# Patient Record
Sex: FEMALE | Race: WHITE | NOT HISPANIC OR LATINO | URBAN - METROPOLITAN AREA
[De-identification: names, ages, dates, MRNs, and addresses within clinical notes are randomized per-mention and may not be internally consistent; named-entity substitution may affect disease eponyms.]

---

## 2017-01-20 ENCOUNTER — OUTPATIENT (OUTPATIENT)
Dept: OUTPATIENT SERVICES | Facility: HOSPITAL | Age: 52
LOS: 1 days | End: 2017-01-20
Payer: COMMERCIAL

## 2017-01-20 ENCOUNTER — APPOINTMENT (OUTPATIENT)
Dept: RADIOLOGY | Facility: HOSPITAL | Age: 52
End: 2017-01-20

## 2017-01-20 PROCEDURE — 73140 X-RAY EXAM OF FINGER(S): CPT

## 2017-05-23 ENCOUNTER — OUTPATIENT (OUTPATIENT)
Dept: OUTPATIENT SERVICES | Facility: HOSPITAL | Age: 52
LOS: 1 days | End: 2017-05-23
Payer: COMMERCIAL

## 2017-05-23 DIAGNOSIS — M54.12 RADICULOPATHY, CERVICAL REGION: ICD-10-CM

## 2017-05-23 PROCEDURE — 77003 FLUOROGUIDE FOR SPINE INJECT: CPT

## 2017-05-23 PROCEDURE — 62321 NJX INTERLAMINAR CRV/THRC: CPT

## 2017-06-06 ENCOUNTER — OUTPATIENT (OUTPATIENT)
Dept: OUTPATIENT SERVICES | Facility: HOSPITAL | Age: 52
LOS: 1 days | End: 2017-06-06
Payer: COMMERCIAL

## 2017-06-06 DIAGNOSIS — M54.12 RADICULOPATHY, CERVICAL REGION: ICD-10-CM

## 2017-06-06 PROCEDURE — 77003 FLUOROGUIDE FOR SPINE INJECT: CPT

## 2017-06-06 PROCEDURE — 62321 NJX INTERLAMINAR CRV/THRC: CPT

## 2018-12-05 ENCOUNTER — OUTPATIENT (OUTPATIENT)
Dept: OUTPATIENT SERVICES | Facility: HOSPITAL | Age: 53
LOS: 1 days | End: 2018-12-05
Payer: COMMERCIAL

## 2018-12-05 DIAGNOSIS — M54.12 RADICULOPATHY, CERVICAL REGION: ICD-10-CM

## 2018-12-05 PROCEDURE — 77003 FLUOROGUIDE FOR SPINE INJECT: CPT

## 2018-12-05 PROCEDURE — 62321 NJX INTERLAMINAR CRV/THRC: CPT

## 2019-02-12 ENCOUNTER — TRANSCRIPTION ENCOUNTER (OUTPATIENT)
Age: 54
End: 2019-02-12

## 2020-01-03 ENCOUNTER — TRANSCRIPTION ENCOUNTER (OUTPATIENT)
Age: 55
End: 2020-01-03

## 2020-06-22 ENCOUNTER — TRANSCRIPTION ENCOUNTER (OUTPATIENT)
Age: 55
End: 2020-06-22

## 2020-07-13 ENCOUNTER — APPOINTMENT (OUTPATIENT)
Dept: RHEUMATOLOGY | Facility: CLINIC | Age: 55
End: 2020-07-13
Payer: COMMERCIAL

## 2020-07-13 VITALS
TEMPERATURE: 98.1 F | HEART RATE: 87 BPM | SYSTOLIC BLOOD PRESSURE: 91 MMHG | HEIGHT: 63 IN | BODY MASS INDEX: 28 KG/M2 | OXYGEN SATURATION: 96 % | DIASTOLIC BLOOD PRESSURE: 64 MMHG | WEIGHT: 158 LBS

## 2020-07-13 DIAGNOSIS — M25.542 PAIN IN JOINTS OF RIGHT HAND: ICD-10-CM

## 2020-07-13 DIAGNOSIS — Z87.891 PERSONAL HISTORY OF NICOTINE DEPENDENCE: ICD-10-CM

## 2020-07-13 DIAGNOSIS — R60.9 EDEMA, UNSPECIFIED: ICD-10-CM

## 2020-07-13 DIAGNOSIS — F17.200 NICOTINE DEPENDENCE, UNSPECIFIED, UNCOMPLICATED: ICD-10-CM

## 2020-07-13 DIAGNOSIS — R52 PAIN, UNSPECIFIED: ICD-10-CM

## 2020-07-13 DIAGNOSIS — M25.541 PAIN IN JOINTS OF RIGHT HAND: ICD-10-CM

## 2020-07-13 PROCEDURE — 99244 OFF/OP CNSLTJ NEW/EST MOD 40: CPT

## 2020-07-13 RX ORDER — CYCLOBENZAPRINE HYDROCHLORIDE 7.5 MG/1
TABLET, FILM COATED ORAL
Refills: 0 | Status: DISCONTINUED | COMMUNITY
End: 2020-07-13

## 2020-07-13 RX ORDER — NABUMETONE 750 MG/1
750 TABLET, FILM COATED ORAL
Refills: 0 | Status: DISCONTINUED | COMMUNITY
End: 2020-07-13

## 2020-07-17 LAB
25(OH)D3 SERPL-MCNC: 38.7 NG/ML
ALBUMIN MFR SERPL ELPH: 62 %
ALBUMIN SERPL-MCNC: 4.1 G/DL
ALBUMIN/GLOB SERPL: 1.6 RATIO
ALPHA1 GLOB MFR SERPL ELPH: 4.5 %
ALPHA1 GLOB SERPL ELPH-MCNC: 0.3 G/DL
ALPHA2 GLOB MFR SERPL ELPH: 9.8 %
ALPHA2 GLOB SERPL ELPH-MCNC: 0.6 G/DL
B-GLOBULIN MFR SERPL ELPH: 10.1 %
B-GLOBULIN SERPL ELPH-MCNC: 0.7 G/DL
CK SERPL-CCNC: 105 U/L
CRP SERPL-MCNC: <0.1 MG/DL
DEPRECATED KAPPA LC FREE/LAMBDA SER: 1.9 RATIO
ENA SCL70 IGG SER IA-ACNC: <0.2 AL
FERRITIN SERPL-MCNC: 63 NG/ML
G6PD SER-CCNC: 13.4 U/G HGB
GAMMA GLOB FLD ELPH-MCNC: 0.9 G/DL
GAMMA GLOB MFR SERPL ELPH: 13.6 %
IGA SER QL IEP: 108 MG/DL
IGG SER QL IEP: 894 MG/DL
IGM SER QL IEP: 138 MG/DL
INTERPRETATION SERPL IEP-IMP: NORMAL
KAPPA LC CSF-MCNC: 1.09 MG/DL
KAPPA LC SERPL-MCNC: 2.07 MG/DL
M PROTEIN SPEC IFE-MCNC: NORMAL
PROT SERPL-MCNC: 6.6 G/DL
PROT SERPL-MCNC: 6.6 G/DL
RNA POLYMERASE III IGG: 2 UNITS

## 2020-07-28 DIAGNOSIS — G62.9 POLYNEUROPATHY, UNSPECIFIED: ICD-10-CM

## 2020-07-29 LAB
EJ AB SER QL: NEGATIVE
ENA JO1 AB SER IA-ACNC: <20 UNITS
ENA PM/SCL AB SER-ACNC: <20 UNITS
ENA SM+RNP AB SER IA-ACNC: <20 UNITS
ENA SS-A IGG SER QL: <20 UNITS
FIBRILLARIN AB SER QL: NEGATIVE
KU AB SER QL: NEGATIVE
MDA-5 (P140)(CADM-140): <20 UNITS
MI2 AB SER QL: NEGATIVE
NXP-2 (P140): <20 UNITS
OJ AB SER QL: NEGATIVE
PL12 AB SER QL: NEGATIVE
PL7 AB SER QL: NEGATIVE
SRP AB SERPL QL: NEGATIVE
TIF GAMMA (P155/140): <20 UNITS
U2 SNRNP AB SER QL: NEGATIVE

## 2020-08-06 RX ORDER — TIZANIDINE 2 MG/1
2 TABLET ORAL
Qty: 100 | Refills: 1 | Status: DISCONTINUED | COMMUNITY
Start: 2020-07-13 | End: 2020-08-06

## 2020-08-17 ENCOUNTER — APPOINTMENT (OUTPATIENT)
Dept: NEUROLOGY | Facility: CLINIC | Age: 55
End: 2020-08-17

## 2020-09-23 ENCOUNTER — APPOINTMENT (OUTPATIENT)
Dept: RHEUMATOLOGY | Facility: CLINIC | Age: 55
End: 2020-09-23

## 2020-10-13 ENCOUNTER — APPOINTMENT (OUTPATIENT)
Dept: RHEUMATOLOGY | Facility: CLINIC | Age: 55
End: 2020-10-13
Payer: COMMERCIAL

## 2020-10-13 VITALS
DIASTOLIC BLOOD PRESSURE: 68 MMHG | OXYGEN SATURATION: 96 % | WEIGHT: 156 LBS | SYSTOLIC BLOOD PRESSURE: 101 MMHG | TEMPERATURE: 98 F | BODY MASS INDEX: 27.64 KG/M2 | RESPIRATION RATE: 16 BRPM | HEART RATE: 80 BPM | HEIGHT: 63 IN

## 2020-10-13 DIAGNOSIS — I73.00 RAYNAUD'S SYNDROME W/OUT GANGRENE: ICD-10-CM

## 2020-10-13 PROCEDURE — 99214 OFFICE O/P EST MOD 30 MIN: CPT

## 2020-10-13 RX ORDER — GABAPENTIN 300 MG/1
300 CAPSULE ORAL
Qty: 90 | Refills: 2 | Status: DISCONTINUED | COMMUNITY
Start: 2020-08-06 | End: 2020-10-13

## 2020-10-13 NOTE — ASSESSMENT
[FreeTextEntry1] : 55 year old female here for evaluation of arthralgias and fatigue. \par \par 1. Arthralgias and fatigue with h/o Raynaud's, photosensitivity and pos JORGE 1:1280 (nucleolar pattern). On exam has puffy appearing hands with palmar erythema. No distinct joint tenderness or synovitis. Unclear if there is a component of inflammation contributing to her symptoms. Since she has had no improvement with NSAIDs and muscle relaxants, will do a trial of Prednisone taper. Risks, benefits and side effects of the medication discussed with the patient in detail. Advised to call me if she experience any side effects\par \par 2. Also has a component of FBM - no change in symptoms with Meloxicam and Tizanidine. Discussed Gabapentin but did not try due to side effects. Will hold off for now and assess symptoms on steroid taper.   \par \par 3. Raynaud's phenomenon: trial of Amlodipine 2.5 mg daily. Will monitor BP closely since patient has a low baseline BP. Risks, benefits and side effects of the medication discussed with the patient in detail. Advised to call me if she experience any side effects\par \par 4. Abnormal Chest CT: advised to f/u PCP or pulmonologist to repeat surveillance chest CT given h/o smoking and multiple pulm nodules on CT chest in 2018. \par \par 5. Bone health: Ca+D as needed \par \par 6. HCM: Discussed pneumonia vaccine today. Patient is not immunocompromised. She would like to discuss the vaccination with her pulmonologist. \par \par Follow up in 2 months

## 2020-10-13 NOTE — REVIEW OF SYSTEMS
[Fever] : no fever [Chills] : no chills [Feeling Tired] : not feeling tired [Recent Weight Gain (___ Lbs)] : no recent weight gain [Recent Weight Loss (___ Lbs)] : no recent weight loss [Joint Swelling] : no joint swelling

## 2020-10-13 NOTE — HISTORY OF PRESENT ILLNESS
[FreeTextEntry1] : Extensive neurologic testing was negative. MRI of the R hand with soft tissues swelling but no synovitis or inflammatory changes. However, continues to c/o discomfort and pain in the hands and feet as well as worsening Raynaud's

## 2020-10-13 NOTE — PHYSICAL EXAM
[FreeTextEntry1] : Diffuse swelling over the digits with very mild skin tightening up to the PIPs. Palmar erythema

## 2020-12-03 ENCOUNTER — APPOINTMENT (OUTPATIENT)
Dept: ENDOCRINOLOGY | Facility: CLINIC | Age: 55
End: 2020-12-03

## 2020-12-07 ENCOUNTER — RX RENEWAL (OUTPATIENT)
Age: 55
End: 2020-12-07

## 2021-02-01 ENCOUNTER — APPOINTMENT (OUTPATIENT)
Dept: RHEUMATOLOGY | Facility: CLINIC | Age: 56
End: 2021-02-01

## 2021-02-16 ENCOUNTER — APPOINTMENT (OUTPATIENT)
Dept: RHEUMATOLOGY | Facility: CLINIC | Age: 56
End: 2021-02-16

## 2022-01-21 ENCOUNTER — TRANSCRIPTION ENCOUNTER (OUTPATIENT)
Age: 57
End: 2022-01-21

## 2023-01-10 ENCOUNTER — APPOINTMENT (OUTPATIENT)
Dept: ORTHOPEDIC SURGERY | Facility: CLINIC | Age: 58
End: 2023-01-10
Payer: COMMERCIAL

## 2023-01-10 VITALS — WEIGHT: 156 LBS | BODY MASS INDEX: 27.64 KG/M2 | HEIGHT: 63 IN

## 2023-01-10 DIAGNOSIS — M54.16 RADICULOPATHY, LUMBAR REGION: ICD-10-CM

## 2023-01-10 PROCEDURE — 72170 X-RAY EXAM OF PELVIS: CPT

## 2023-01-10 PROCEDURE — 72100 X-RAY EXAM L-S SPINE 2/3 VWS: CPT

## 2023-01-10 PROCEDURE — 99214 OFFICE O/P EST MOD 30 MIN: CPT

## 2023-01-10 NOTE — ASSESSMENT
[FreeTextEntry1] : will start her on medrol robaxin and therapy f/u 2 weeks with pain mgt if not improved

## 2023-01-10 NOTE — HISTORY OF PRESENT ILLNESS
[Lower back] : lower back [Dull/Aching] : dull/aching [Radiating] : radiating [de-identified] : 1-10-23- she states one day history of atraumatic lower back pain with limping and radicular complaints. states pain came on after she stood up from prolonged sitting. she tried otc nsaids with minimal help\par \par works a desk position  [] : no [FreeTextEntry3] : 01/09/23 [FreeTextEntry5] : patient first felt pain while getting out of her car but then the pain got progressively worse time.  [FreeTextEntry7] : into right buttocks

## 2023-01-19 ENCOUNTER — APPOINTMENT (OUTPATIENT)
Dept: PAIN MANAGEMENT | Facility: CLINIC | Age: 58
End: 2023-01-19
Payer: COMMERCIAL

## 2023-01-19 VITALS — BODY MASS INDEX: 27.64 KG/M2 | HEIGHT: 63 IN | WEIGHT: 156 LBS

## 2023-01-19 DIAGNOSIS — S33.5XXA SPRAIN OF LIGAMENTS OF LUMBAR SPINE, INITIAL ENCOUNTER: ICD-10-CM

## 2023-01-19 DIAGNOSIS — M79.10 MYALGIA, UNSPECIFIED SITE: ICD-10-CM

## 2023-01-19 PROCEDURE — 99244 OFF/OP CNSLTJ NEW/EST MOD 40: CPT | Mod: 25

## 2023-01-19 PROCEDURE — 20552 NJX 1/MLT TRIGGER POINT 1/2: CPT

## 2023-01-19 PROCEDURE — J3490M: CUSTOM

## 2023-01-19 NOTE — PHYSICAL EXAM
[de-identified] : PHYSICAL EXAM\par \par Constitutional: \par Appears well, no apparent distress\par Ability to communicate: Normal\par Respiratory: non-labored breathing\par Skin: no rash noted\par Head: normocephalic, atraumatic\par Neck: no visible thyroid enlargement\par Eyes: extraocular movements intact\par Neurologic: alert and oriented x3\par Psychiatric: normal mood, affect, and behavior\par \par Lumbar Spine: \par Palpation: left and right lumbar paraspinal spasm and left and right lumbar paraspinal tenderness to palpation.\par ROM: Diminished range of motion in all plains.  Patient notes pain with lateral bending to the left and right.\par MMT: Motor exam is 5/5 through out bilateral lower extremities.\par Sensation: Light touch and pain is intact throughout bilateral lower extremities.\par Reflexes: achilles and patella reflexes are intact and  symmetrical.  No sustained clonus.\par Special Testing: Positive kemps maneuver on the left and right side\par \par

## 2023-01-19 NOTE — REASON FOR VISIT
[Initial Consultation] : an initial pain management consultation [FreeTextEntry2] :  referred pt for back pain

## 2023-01-19 NOTE — HISTORY OF PRESENT ILLNESS
[Lower back] : lower back [9] : 9 [Dull/Aching] : dull/aching [Tightness] : tightness [Constant] : constant [Household chores] : household chores [Leisure] : leisure [Sleep] : sleep [Meds] : meds [Heat] : heat [Standing] : standing [Walking] : walking [Lying in bed] : lying in bed [] : no [FreeTextEntry7] : buttocks, neck [de-identified] : positioning

## 2023-02-13 ENCOUNTER — APPOINTMENT (OUTPATIENT)
Dept: PAIN MANAGEMENT | Facility: CLINIC | Age: 58
End: 2023-02-13

## 2023-12-04 ENCOUNTER — APPOINTMENT (OUTPATIENT)
Dept: ORTHOPEDIC SURGERY | Facility: CLINIC | Age: 58
End: 2023-12-04
Payer: COMMERCIAL

## 2023-12-04 VITALS — BODY MASS INDEX: 27.64 KG/M2 | WEIGHT: 156 LBS | HEIGHT: 63 IN

## 2023-12-04 DIAGNOSIS — M17.12 UNILATERAL PRIMARY OSTEOARTHRITIS, LEFT KNEE: ICD-10-CM

## 2023-12-04 DIAGNOSIS — M17.11 UNILATERAL PRIMARY OSTEOARTHRITIS, RIGHT KNEE: ICD-10-CM

## 2023-12-04 DIAGNOSIS — M23.92 UNSPECIFIED INTERNAL DERANGEMENT OF LEFT KNEE: ICD-10-CM

## 2023-12-04 PROCEDURE — 99214 OFFICE O/P EST MOD 30 MIN: CPT | Mod: 25

## 2023-12-04 PROCEDURE — 73564 X-RAY EXAM KNEE 4 OR MORE: CPT | Mod: 50

## 2023-12-04 PROCEDURE — 20611 DRAIN/INJ JOINT/BURSA W/US: CPT | Mod: 50

## 2023-12-04 PROCEDURE — J3490M: CUSTOM

## 2023-12-04 RX ORDER — MELOXICAM 15 MG/1
15 TABLET ORAL DAILY
Qty: 30 | Refills: 1 | Status: COMPLETED | COMMUNITY
Start: 2023-12-04 | End: 2024-02-02

## 2023-12-04 RX ORDER — AMLODIPINE BESYLATE 2.5 MG/1
2.5 TABLET ORAL
Qty: 30 | Refills: 0 | Status: DISCONTINUED | COMMUNITY
Start: 2020-10-13 | End: 2023-12-04

## 2023-12-04 RX ORDER — MELOXICAM 7.5 MG/1
7.5 TABLET ORAL TWICE DAILY
Qty: 60 | Refills: 1 | Status: DISCONTINUED | COMMUNITY
Start: 2020-07-13 | End: 2023-12-04

## 2023-12-04 RX ORDER — METHOCARBAMOL 750 MG/1
750 TABLET, FILM COATED ORAL TWICE DAILY
Qty: 60 | Refills: 0 | Status: DISCONTINUED | COMMUNITY
Start: 2023-01-10 | End: 2023-12-04

## 2023-12-04 RX ORDER — PREDNISONE 5 MG/1
5 TABLET ORAL
Qty: 50 | Refills: 0 | Status: DISCONTINUED | COMMUNITY
Start: 2020-10-13 | End: 2023-12-04

## 2023-12-04 RX ORDER — METHYLPREDNISOLONE 4 MG/1
4 TABLET ORAL
Qty: 1 | Refills: 0 | Status: DISCONTINUED | COMMUNITY
Start: 2023-01-10 | End: 2023-12-04

## 2023-12-04 RX ORDER — LEVOCETIRIZINE DIHYDROCHLORIDE 5 MG/1
5 TABLET ORAL
Refills: 0 | Status: DISCONTINUED | COMMUNITY
End: 2023-12-04

## 2023-12-18 ENCOUNTER — APPOINTMENT (OUTPATIENT)
Dept: ORTHOPEDIC SURGERY | Facility: CLINIC | Age: 58
End: 2023-12-18

## 2023-12-29 ENCOUNTER — APPOINTMENT (OUTPATIENT)
Dept: ORTHOPEDIC SURGERY | Facility: CLINIC | Age: 58
End: 2023-12-29

## 2024-04-11 NOTE — REVIEW OF SYSTEMS
Physical Therapy   Date: 4/11/2024  Patient did not attend nor call to cancel  today's (4/11/2024) scheduled appointment.  This is the patient's first no show/late cancel.      Voice message was left on the patient's home number indicating missed appointment and date of next scheduled appointment.  Attendance guidelines were reviewed.  
[Negative] : Heme/Lymph

## 2024-07-24 RX ORDER — PANTOPRAZOLE 40 MG/1
TABLET, DELAYED RELEASE ORAL
Refills: 0 | Status: ACTIVE | COMMUNITY

## 2024-07-24 RX ORDER — ALBUTEROL SULFATE 90 UG/1
108 (90 BASE) INHALANT RESPIRATORY (INHALATION)
Refills: 0 | Status: ACTIVE | COMMUNITY

## 2024-07-24 RX ORDER — CLOBETASOL PROPIONATE 0.5 MG/G
0.05 OINTMENT TOPICAL
Refills: 0 | Status: ACTIVE | COMMUNITY

## 2024-07-24 RX ORDER — DENOSUMAB 60 MG/ML
INJECTION SUBCUTANEOUS
Refills: 0 | Status: ACTIVE | COMMUNITY

## 2024-07-29 ENCOUNTER — APPOINTMENT (OUTPATIENT)
Dept: OBGYN | Facility: CLINIC | Age: 59
End: 2024-07-29
Payer: COMMERCIAL

## 2024-07-29 VITALS — DIASTOLIC BLOOD PRESSURE: 60 MMHG | BODY MASS INDEX: 27.28 KG/M2 | WEIGHT: 154 LBS | SYSTOLIC BLOOD PRESSURE: 90 MMHG

## 2024-07-29 DIAGNOSIS — N94.11 SUPERFICIAL (INTROITAL) DYSPAREUNIA: ICD-10-CM

## 2024-07-29 PROCEDURE — 99204 OFFICE O/P NEW MOD 45 MIN: CPT | Mod: 25

## 2024-07-29 PROCEDURE — 56821 COLPOSCOPY VULVA W/BIOPSY: CPT

## 2024-07-29 NOTE — PHYSICAL EXAM
[Chaperone Present] : A chaperone was present in the examining room during all aspects of the physical examination [TextEntry] : ***General*** General Appearance: normal; Judgment and insight: normal; the patient is oriented to time, place and person; Recent and remote memory: normal; Mood and affect: normal; Respiratory effort: normal.  ***Pelvic Examination*** External genitalia: the appearance and hair distribution are normal; no lesions are appreciated. Urethra/urethral meatus: no masses or tenderness are appreciated; there is no scarring noted. Bladder: nontender; there is no fullness appreciated; no masses were palpated. Vagina: the vagina is atrophic; no abnormal discharge is seen; no lesions are seen; pelvic support is adequate without any evidence of cystocele or rectocele. Cervix: normal in appearance; no overt lesions are seen. Uterus: Anteverted; normal in size, mobile, nontender, and well supported. Adnexa/parametria: nontender and not enlarged; no masses are palpated. A stool specimen was not indicated at this time.   ***colposcopy of the vulva*** Colposcopy of the external genitalia showed that the labia majora and labia minora were normal. She identified the intergluteal skin as the location of her pain/itching/irritation. There was 3/10 vestibular tenderness of the anterior minor vestibular glands, and 3/10 vestibular tenderness of the posterior minor vestibular glands. There was white epithelium in the anterior vestibule, inter labia minora, the base of the labia minora, the perineum and intergluteal crease with fissures in the intergluteal crease.  There was also a shallow ulcer (1 to 2 cm in size with a white base in the left inner gluteal skin.  A biopsy was done.  Evidence of other dermatopathology. There was no erythema of the introitus.  There was steroid dermatitis noted over the labia majora.  There was mild vulvar atrophy at the introitus.

## 2024-07-29 NOTE — HISTORY OF PRESENT ILLNESS
[FreeTextEntry1] : The patient is 59 years old  3 para 2-0-0-3 whose last menstrual period was in 2018 at the age of 53.  She is referred for evaluation of lichen sclerosus.  The patient has had crest syndrome for the past 3 years.  She is also complaining of painful intercourse on penetration.  A biopsy in  was consistent with "evolving lichen sclerosus".  She has been using clobetasol for the past 2.5 years to address her painful intercourse (twice daily for 2 weeks after which she stopped for 1 week and then has intercourse, develops pain, uses clobetasol twice daily for 2 weeks and tries to have intercourse again (the cycle then repeats itself).  She only started itching and burning recently.  She was never given estradiol cream.  She has been sexually inactive for the past year because of the pain.  Her pain is now debilitating and is primarily rectal. ***The patient was asked about all of the following, positive responses are listed below*** Gynecologic History: History of breast biopsy or breast cyst aspiration; Pain during or after intercourse; Vaginal bleeding or spotting between periods; Breast discharge; Abnormal or irregular periods; Abnormal vaginal discharge; history of Gynecologic cancer; history of Ovarian cysts; history of Fibroids; history of frequent Urinary tract infections; Urinary problems (i.e. frequency, urgency, difficulty, leaking); history of Pelvic pain/pelvic inflammatory disease; history of chronic Vaginal infections (i.e., yeast, Trichomonas, bacterial Vaginosis) Contraceptive History: What she is currently using for birth control; If she was requesting birth control today. Sexually Transmitted Disease History: A history of any of the following sexually transmitted diseases: Gonorrhea; Chlamydia; Syphilis; Herpes; HPV/Warts; Hepatitis; HIV/AIDS and whether she feels that she is at risk for HIV/AIDS and wants to be tested for HIV. Abnormal Pap Smear History: History of abnormal Pap smear; evaluation of any abnormalities; treatment of any abnormalities Date and result of her last Pap smear. MATHEUS in Utero Exposure History: A history of personal or maternal MATHEUS exposure. Hospitalizations (other than childbirth) Blood transfusions. Review of Systems: General: weight change, general health; Head: headaches, vertigo; Eyes: vision, diplopia; Ears: change in hearing, tinnitus; Nose: epistaxis, chronic rhinitis; Mouth: gingival bleeding; Neck: stiffness, pain, masses; Chest: dyspnea, wheezing, hemoptysis, persistent cough; Heart: chest pains, palpitations; Abdomen: liver disease, abdominal discomfort, Indigestion, Nausea/Vomiting, Constipation/Diarrhea, Blood in stool, change in bowel habits; : renal disease; Musculoskeletal: pain in muscles or joints, paresthesias or numbness; Skin: rashes, itching, pigmentation changes; Neurologic: weakness, tremor, seizures, changes in mentation; Psychiatric: depressive symptoms, changes in sleep habits. Surgery: History of gynecological surgery; History of non-gynecological surgery.   ***Pregnancy History*** # Pregnancies 2; # deliveries 2; # vaginal 2.   ***Menstrual History*** Age of first period:15; # of days between cycles:28; duration of period: 7 days; she had cramps with periods; used Advil, Tylenol to relieve cramps.   ***Menopause*** Menopause at age 53; The patient was asked about all of the following, positive responses are listed below: menopausal symptoms; past use of hormonal replacement therapy. Vaginal dryness.   ***Sexual history*** She is not sexually active; Coitarche: 16; Total # of lifetime partners:6; She denies having more than one current partner; She has been with her current partner for 4 years; Date of last sexual contact (if not currently sexually active): 2023.   ***Medical history*** Alcohol abuse; Anemia; Anxiety; Arthritis/Lupus; Asthma; Blood clots in legs; Blood disorders; Breast problems; Cancer; Colitis; Depression; Diabetes; Drug Abuse; Eating disorder; Elevated cholesterol; Epilepsy/seizures; Eye problems/glaucoma; Gall bladder disease; Gout; Head or neck radiation; Hearing problems; Heart Disease; Hemorrhoid; Hepatitis or jaundice; High blood pressure; HIV/AIDS; Kidney disease; Low back problems; Neurological disorders; Osteoporosis; Other; Pneumonia; Rheumatic fever; Skin diseases; Stroke; Thyroid disease; Ulcers Family History - Cancers of the Breast; Cervix; Uterus; Ovarian; Lung; Colon; Other Vaccination Status - Whether she had all the usual childhood vaccinations or illness (negative answers recorded); Whether she was not vaccinated for HPV (All 3 doses) Social History - Current or past cigarette smoking; current or past alcohol abuse; current or past Marijuana use; current or past Cocaine use; current or past abuse of any other illegal or prescription drugs; current employment.   ***Preventative Care*** Date of the patient's last: Pap smear:  Breast exam:  Mammogram:  Cholesterol check:  Sigmoidoscopy/colonoscopy:    ***Social History*** The patient was asked about all of the following; positive responses are listed below: current or past cigarette smoking; Alcohol; current or past Marijuana use; current or past Cocaine use; current or past abuse of any other illegal or prescription drugs. Employment: [employer].   ***Significant positives*** Medications that she was taking at the time of the visit include Clobetasol ointment, Prolia, Albuterol and Pantoprazole. She was not taking any other medication at the time of the visit. She reports allergy to Penicillin and Vancomycin. She denied any other medication allergies at the time of the visit. Her past gynecological history is significant for lichen sclerosus (biopsy proven), urinary tract infections/urinary problems, herpes (), abnormal Pap tests/HPV +(>10+yrs ago), pain with intercourse, ovarian cysts and vaginal infections/BV. Her past gynecological history is not otherwise significant. She is not sexually active. She has a history of abnormal Pap tests. Her last Pap test was  and was normal. Her past medical history is significant for lower back problems, osteoporosis, hyperlipidemia and CREST Syndrome. Her past surgical history is significant for tonsillectomy () and abdominoplasty (). Her past medical and surgical histories are not otherwise significant. Besides hospitalizations for surgeries and deliveries, she has not been hospitalized. She was not vaccinated for HPV. A 20-point review of systems was significant for fatigue, vision problems, shortness of breath and swollen ankles. Her review of systems was not otherwise significant. Her family's cancer history is significant for mother with breast cancer (39 y/o) and maternal grandmother 80's) and sister with cervical cancer.  Her family's cancer history is not otherwise significant. She stopped smoking in . Prior to this she had smoked 1/2 PPD for 30 years. She currently vapes. She occasionally consumes alcohol.

## 2024-07-29 NOTE — ASSESSMENT
[TextEntry] : The patient has crest syndrome for 3 years.  The patient has had painful intercourse on penetration.  A biopsy in 2020 showed lichen sclerosus.  For the past 2.5 years, she has been using clobetasol twice daily for 2 weeks as treatment for the painful intercourse.  After 2 weeks, she stopped for 1 week, had painful intercourse again and restarted another 2-week course.  She has been sexually inactive for 1 year because of the pain.  She is now having perianal pain.  She has lichen sclerosus on her inner labia minora, anterior vestibule, perianal and intergluteal crease.  She has not been using clobetasol in this area.  Of note, there is steroid dermatitis over the labia majora where she is using the clobetasol.  There is also a shallow ulcer with a white base in the left inner gluteal cheek.  Cancer must be ruled out.  A biopsy was taken.  There is mild vestibular tenderness on examination.  Of note, she has never used vulvar estradiol cream. 51 minutes of total time was spent on the date of the encounter. This included time for review of medical records and laboratory results, face to face time (including the physical examination counseling and coordination of care), time for patient education, treatment plans, answering questions, communicating with other doctors and for medical record documentation.  The time spent is separate from time spent on separately billed procedures.

## 2024-07-29 NOTE — PLAN
[FreeTextEntry1] : I recommended that she call for the results of her vulvar biopsy in 1 week.  I recommended that she discontinue using clobetasol on the external vulvar skin where she has steroid dermatitis but started using it twice daily in the inner labia minora and perineum and once daily in the intergluteal crease.  When she returns, in addition to evaluating her lichen sclerosus, I will begin the evaluation of her painful intercourse.

## 2024-07-29 NOTE — HISTORY OF PRESENT ILLNESS
[FreeTextEntry1] : The patient is 59 years old  3 para 2-0-0-3 whose last menstrual period was in 2018 at the age of 53.  She is referred for evaluation of lichen sclerosus.  The patient has had crest syndrome for the past 3 years.  She is also complaining of painful intercourse on penetration.  A biopsy in  was consistent with "evolving lichen sclerosus".  She has been using clobetasol for the past 2.5 years to address her painful intercourse (twice daily for 2 weeks after which she stopped for 1 week and then has intercourse, develops pain, uses clobetasol twice daily for 2 weeks and tries to have intercourse again (the cycle then repeats itself).  She only started itching and burning recently.  She was never given estradiol cream.  She has been sexually inactive for the past year because of the pain.  Her pain is now debilitating and is primarily rectal. ***The patient was asked about all of the following, positive responses are listed below*** Gynecologic History: History of breast biopsy or breast cyst aspiration; Pain during or after intercourse; Vaginal bleeding or spotting between periods; Breast discharge; Abnormal or irregular periods; Abnormal vaginal discharge; history of Gynecologic cancer; history of Ovarian cysts; history of Fibroids; history of frequent Urinary tract infections; Urinary problems (i.e. frequency, urgency, difficulty, leaking); history of Pelvic pain/pelvic inflammatory disease; history of chronic Vaginal infections (i.e., yeast, Trichomonas, bacterial Vaginosis) Contraceptive History: What she is currently using for birth control; If she was requesting birth control today. Sexually Transmitted Disease History: A history of any of the following sexually transmitted diseases: Gonorrhea; Chlamydia; Syphilis; Herpes; HPV/Warts; Hepatitis; HIV/AIDS and whether she feels that she is at risk for HIV/AIDS and wants to be tested for HIV. Abnormal Pap Smear History: History of abnormal Pap smear; evaluation of any abnormalities; treatment of any abnormalities Date and result of her last Pap smear. MATHEUS in Utero Exposure History: A history of personal or maternal MATHEUS exposure. Hospitalizations (other than childbirth) Blood transfusions. Review of Systems: General: weight change, general health; Head: headaches, vertigo; Eyes: vision, diplopia; Ears: change in hearing, tinnitus; Nose: epistaxis, chronic rhinitis; Mouth: gingival bleeding; Neck: stiffness, pain, masses; Chest: dyspnea, wheezing, hemoptysis, persistent cough; Heart: chest pains, palpitations; Abdomen: liver disease, abdominal discomfort, Indigestion, Nausea/Vomiting, Constipation/Diarrhea, Blood in stool, change in bowel habits; : renal disease; Musculoskeletal: pain in muscles or joints, paresthesias or numbness; Skin: rashes, itching, pigmentation changes; Neurologic: weakness, tremor, seizures, changes in mentation; Psychiatric: depressive symptoms, changes in sleep habits. Surgery: History of gynecological surgery; History of non-gynecological surgery.   ***Pregnancy History*** # Pregnancies 2; # deliveries 2; # vaginal 2.   ***Menstrual History*** Age of first period:15; # of days between cycles:28; duration of period: 7 days; she had cramps with periods; used Advil, Tylenol to relieve cramps.   ***Menopause*** Menopause at age 53; The patient was asked about all of the following, positive responses are listed below: menopausal symptoms; past use of hormonal replacement therapy. Vaginal dryness.   ***Sexual history*** She is not sexually active; Coitarche: 16; Total # of lifetime partners:6; She denies having more than one current partner; She has been with her current partner for 4 years; Date of last sexual contact (if not currently sexually active): 2023.   ***Medical history*** Alcohol abuse; Anemia; Anxiety; Arthritis/Lupus; Asthma; Blood clots in legs; Blood disorders; Breast problems; Cancer; Colitis; Depression; Diabetes; Drug Abuse; Eating disorder; Elevated cholesterol; Epilepsy/seizures; Eye problems/glaucoma; Gall bladder disease; Gout; Head or neck radiation; Hearing problems; Heart Disease; Hemorrhoid; Hepatitis or jaundice; High blood pressure; HIV/AIDS; Kidney disease; Low back problems; Neurological disorders; Osteoporosis; Other; Pneumonia; Rheumatic fever; Skin diseases; Stroke; Thyroid disease; Ulcers Family History - Cancers of the Breast; Cervix; Uterus; Ovarian; Lung; Colon; Other Vaccination Status - Whether she had all the usual childhood vaccinations or illness (negative answers recorded); Whether she was not vaccinated for HPV (All 3 doses) Social History - Current or past cigarette smoking; current or past alcohol abuse; current or past Marijuana use; current or past Cocaine use; current or past abuse of any other illegal or prescription drugs; current employment.   ***Preventative Care*** Date of the patient's last: Pap smear:  Breast exam:  Mammogram:  Cholesterol check:  Sigmoidoscopy/colonoscopy:    ***Social History*** The patient was asked about all of the following; positive responses are listed below: current or past cigarette smoking; Alcohol; current or past Marijuana use; current or past Cocaine use; current or past abuse of any other illegal or prescription drugs. Employment: [employer].   ***Significant positives*** Medications that she was taking at the time of the visit include Clobetasol ointment, Prolia, Albuterol and Pantoprazole. She was not taking any other medication at the time of the visit. She reports allergy to Penicillin and Vancomycin. She denied any other medication allergies at the time of the visit. Her past gynecological history is significant for lichen sclerosus (biopsy proven), urinary tract infections/urinary problems, herpes (), abnormal Pap tests/HPV +(>10+yrs ago), pain with intercourse, ovarian cysts and vaginal infections/BV. Her past gynecological history is not otherwise significant. She is not sexually active. She has a history of abnormal Pap tests. Her last Pap test was  and was normal. Her past medical history is significant for lower back problems, osteoporosis, hyperlipidemia and CREST Syndrome. Her past surgical history is significant for tonsillectomy () and abdominoplasty (). Her past medical and surgical histories are not otherwise significant. Besides hospitalizations for surgeries and deliveries, she has not been hospitalized. She was not vaccinated for HPV. A 20-point review of systems was significant for fatigue, vision problems, shortness of breath and swollen ankles. Her review of systems was not otherwise significant. Her family's cancer history is significant for mother with breast cancer (37 y/o) and maternal grandmother 80's) and sister with cervical cancer.  Her family's cancer history is not otherwise significant. She stopped smoking in . Prior to this she had smoked 1/2 PPD for 30 years. She currently vapes. She occasionally consumes alcohol.

## 2024-08-05 ENCOUNTER — NON-APPOINTMENT (OUTPATIENT)
Age: 59
End: 2024-08-05

## 2024-08-06 ENCOUNTER — NON-APPOINTMENT (OUTPATIENT)
Age: 59
End: 2024-08-06

## 2024-08-08 ENCOUNTER — APPOINTMENT (OUTPATIENT)
Dept: OBGYN | Facility: CLINIC | Age: 59
End: 2024-08-08

## 2024-08-08 PROBLEM — N90.4 LICHEN SCLEROSUS OF VULVA: Status: ACTIVE | Noted: 2024-07-29

## 2024-08-08 PROBLEM — L30.9 VULVAR DERMATITIS: Status: ACTIVE | Noted: 2024-08-08

## 2024-08-08 PROCEDURE — 87210 SMEAR WET MOUNT SALINE/INK: CPT | Mod: QW

## 2024-08-08 PROCEDURE — 99213 OFFICE O/P EST LOW 20 MIN: CPT | Mod: 25

## 2024-08-08 PROCEDURE — 56820 COLPOSCOPY VULVA: CPT

## 2024-08-08 NOTE — ASSESSMENT
[TextEntry] : The patient is using clobetasol twice daily on the inner labia for her lichen sclerosus.  However, she has erythema of the labia majora, perineum and perianal skin.  At her last visit, I suspected that this might be steroid dermatitis from using clobetasol in that area at the time (she no longer uses it there).  The area has now gotten redder.  There is no introital erythema and no suspicion of Candida (pending culture).  I suspect that the erythema may be associated with withdrawal from the topical steroid she had applied to that area before her visit. 24 minutes of total time was spent on the date of the encounter. This included time for review of medical records and laboratory results, face to face time (including the physical examination counseling and coordination of care), time for patient education, treatment plans, answering questions, communicating with other doctors and for medical record documentation.  The time spent is separate from time spent on separately billed procedures.

## 2024-08-08 NOTE — PLAN
[FreeTextEntry1] : I recommended that she continue to apply clobetasol to the white epithelium in the inner labia but also gave her tacrolimus 0.1% ointment to use on the red skin of the labia majora, perineum and perianal skin.  She can use it twice daily until the erythema resolves.  She should keep her original appointment in 6 weeks.

## 2024-08-08 NOTE — PHYSICAL EXAM
[Chaperone Present] : A chaperone was present in the examining room during all aspects of the physical examination [TextEntry] : ***General*** General Appearance: normal; Judgment and insight: normal; the patient is oriented to time, place and person; Recent and remote memory: normal; Mood and affect: normal; Respiratory effort: normal.  ***Pelvic Examination*** External genitalia: the appearance and hair distribution are normal; no lesions are appreciated. Urethra/urethral meatus: no masses or tenderness are appreciated; there is no scarring noted. Bladder: nontender; there is no fullness appreciated; no masses were palpated. Vagina: the vagina is atrophic; no abnormal discharge is seen; no lesions are seen; pelvic support is adequate without any evidence of cystocele or rectocele. Cervix: normal in appearance; no overt lesions are seen. Uterus: Anteverted; normal in size, mobile, nontender, and well supported. Adnexa/parametria: nontender and not enlarged; no masses are palpated. A stool specimen was not indicated at this time.  ***Vaginal Discharge Evaluation***  A saline wet mount of the vaginal discharge and KOH slide evaluation of the vaginal discharge were done.  The discharge was scanty; the pH was elevated; the whiff test was negative; clue cells were not seen; hyphae were not seen; no lactobacilli were seen; no white blood cells were seen; parabasal cells were seen; a candida culture was sent.  ***colposcopy of the vulva*** Colposcopy of the external genitalia showed that the labia majora and labia minora were normal. She identified the intergluteal skin as the location of her pain/itching/irritation. There was no vestibular tenderness of the anterior minor vestibular glands, and no vestibular tenderness of the posterior minor vestibular glands. There was white epithelium in the anterior vestibule, inter labia minora, the base of the labia minora, the perineum and intergluteal crease with fissures in the intergluteal crease. The biopsy site at the location of the shallow ulcer (seen at her last visit) in the left inner gluteal skin was healing slowly.  There was no evidence of other dermatopathology. There was no erythema of the introitus. There was erythema noted over the labia majora extending down the perineum to the perianal skin. There was mild vulvar atrophy at the introitus.

## 2024-08-08 NOTE — HISTORY OF PRESENT ILLNESS
[FreeTextEntry1] : The patient has crest syndrome for 3 years. The patient has had painful intercourse on penetration. A biopsy in 2020 showed lichen sclerosus. For the past 2.5 years, she has been using clobetasol twice daily for 2 weeks as treatment for the painful intercourse. After 2 weeks, she stopped for 1 week, had painful intercourse again and restarted another 2-week course.  At her last visit, there is also a shallow ulcer with a white base in the left inner gluteal cheek.  A biopsy showed no evidence of malignancy.  The patient has been using clobetasol twice daily but now reports severe redness to vulva and perianal area. She denies any pain or itching.

## 2024-08-13 ENCOUNTER — NON-APPOINTMENT (OUTPATIENT)
Age: 59
End: 2024-08-13

## 2024-08-14 DIAGNOSIS — B37.9 CANDIDIASIS, UNSPECIFIED: ICD-10-CM

## 2024-08-14 RX ORDER — FLUCONAZOLE 200 MG/1
200 TABLET ORAL
Qty: 3 | Refills: 0 | Status: ACTIVE | COMMUNITY
Start: 2024-08-14 | End: 1900-01-01

## 2024-09-11 ENCOUNTER — APPOINTMENT (OUTPATIENT)
Dept: OBGYN | Facility: CLINIC | Age: 59
End: 2024-09-11
Payer: COMMERCIAL

## 2024-09-11 VITALS — SYSTOLIC BLOOD PRESSURE: 102 MMHG | DIASTOLIC BLOOD PRESSURE: 68 MMHG

## 2024-09-11 DIAGNOSIS — N90.4 LEUKOPLAKIA OF VULVA: ICD-10-CM

## 2024-09-11 PROCEDURE — 56820 COLPOSCOPY VULVA: CPT

## 2024-09-11 PROCEDURE — 99213 OFFICE O/P EST LOW 20 MIN: CPT | Mod: 25

## 2024-09-11 PROCEDURE — 87210 SMEAR WET MOUNT SALINE/INK: CPT | Mod: QW

## 2024-09-11 NOTE — PLAN
[FreeTextEntry1] : I recommended that she use tacrolimus twice daily in all areas and clobetasol once daily in the anterior vestibule, labia minora and perineum.  She should call for the results of her yeast culture in 1 week.  She should return in 2 months for a repeat evaluation.

## 2024-09-11 NOTE — PHYSICAL EXAM
[Chaperone Present] : A chaperone was present in the examining room during all aspects of the physical examination [TextEntry] : ***General*** General Appearance: normal; Judgment and insight: normal; the patient is oriented to time, place and person; Recent and remote memory: normal; Mood and affect: normal; Respiratory effort: normal.  ***Pelvic Examination*** External genitalia: the appearance and hair distribution are normal; no lesions are appreciated. Urethra/urethral meatus: no masses or tenderness are appreciated; there is no scarring noted. Bladder: nontender; there is no fullness appreciated; no masses were palpated. Vagina: the vagina is atrophic; no abnormal discharge is seen; no lesions are seen; pelvic support is adequate without any evidence of cystocele or rectocele. Cervix: normal in appearance; no overt lesions are seen. Uterus: Anteverted; normal in size, mobile, nontender, and well supported. Adnexa/parametria: nontender and not enlarged; no masses are palpated. A stool specimen was not indicated at this time.  ***Vaginal Discharge Evaluation*** A saline wet mount of the vaginal discharge and KOH slide evaluation of the vaginal discharge were done. The discharge was scanty; the pH was elevated; the whiff test was negative; clue cells were not seen; hyphae were not seen; no lactobacilli were seen; no white blood cells were seen; parabasal cells were seen; a candida culture was sent.  ***colposcopy of the vulva*** Colposcopy of the external genitalia showed that the labia majora and labia minora were normal. She identified the anterior vestibule as the location of her pain/itching/irritation. There was no vestibular tenderness of the anterior minor vestibular glands, and no vestibular tenderness of the posterior minor vestibular glands. There was white epithelium in the anterior vestibule, inner labia minora, the base of the left labium minus and the perineum.  There was no white epithelium in the intergluteal crease and no fissures in the intergluteal crease. There was no evidence of other dermatopathology. There was no erythema of the introitus. The erythema noted over the labia majora extending down the perineum to the perianal skin was improved but still present. There was mild vulvar atrophy at the introitus.  There was an erythematous patch in the posterior intergluteal skin.

## 2024-09-11 NOTE — HISTORY OF PRESENT ILLNESS
[FreeTextEntry1] : The patient is using clobetasol twice daily and tacrolimus twice daily.  Following her last visit, she used 3 doses of fluconazole for Candida.  She is feeling better but continues to have irritation in the posterior intergluteal skin 24 minutes of total time was spent on the date of the encounter. This included time for review of medical records and laboratory results, face to face time (including the physical examination counseling and coordination of care), time for patient education, treatment plans, answering questions, communicating with other doctors and for medical record documentation. The time spent is separate from time spent on separately billed procedures.

## 2024-09-11 NOTE — ASSESSMENT
[TextEntry] : The patient is using clobetasol and tacrolimus both twice daily.  She also took fluconazole for Candida vulvovaginitis.  There is no evidence of Candida (pending culture).  Her lichen sclerosus is improved but there continues to be white epithelium in the anterior vestibule, on the labia minora and in the perineum.  Her steroid dermatitis was improved but not completely resolved.  There was also an erythematous patch in the posterior intergluteal skin. 24 minutes of total time was spent on the date of the encounter. This included time for review of medical records and laboratory results, face to face time (including the physical examination counseling and coordination of care), time for patient education, treatment plans, answering questions, communicating with other doctors and for medical record documentation.  The time spent is separate from time spent on separately billed procedures.

## 2024-09-25 ENCOUNTER — TRANSCRIPTION ENCOUNTER (OUTPATIENT)
Age: 59
End: 2024-09-25

## 2024-11-13 ENCOUNTER — APPOINTMENT (OUTPATIENT)
Dept: OBGYN | Facility: CLINIC | Age: 59
End: 2024-11-13
Payer: COMMERCIAL

## 2024-11-13 VITALS
WEIGHT: 156 LBS | BODY MASS INDEX: 27.64 KG/M2 | SYSTOLIC BLOOD PRESSURE: 98 MMHG | HEIGHT: 63 IN | DIASTOLIC BLOOD PRESSURE: 60 MMHG

## 2024-11-13 PROCEDURE — 56820 COLPOSCOPY VULVA: CPT

## 2024-11-13 PROCEDURE — 99213 OFFICE O/P EST LOW 20 MIN: CPT | Mod: 25

## 2025-07-16 ENCOUNTER — NON-APPOINTMENT (OUTPATIENT)
Age: 60
End: 2025-07-16

## 2025-07-16 ENCOUNTER — APPOINTMENT (OUTPATIENT)
Facility: CLINIC | Age: 60
End: 2025-07-16
Payer: COMMERCIAL

## 2025-07-16 VITALS
DIASTOLIC BLOOD PRESSURE: 74 MMHG | HEART RATE: 84 BPM | WEIGHT: 150 LBS | OXYGEN SATURATION: 94 % | BODY MASS INDEX: 26.57 KG/M2 | SYSTOLIC BLOOD PRESSURE: 109 MMHG

## 2025-07-16 PROCEDURE — 99459 PELVIC EXAMINATION: CPT

## 2025-07-16 PROCEDURE — 99213 OFFICE O/P EST LOW 20 MIN: CPT | Mod: 25

## 2025-07-16 PROCEDURE — 56820 COLPOSCOPY VULVA: CPT

## 2025-07-16 PROCEDURE — 87210 SMEAR WET MOUNT SALINE/INK: CPT | Mod: QW

## 2025-07-22 ENCOUNTER — NON-APPOINTMENT (OUTPATIENT)
Age: 60
End: 2025-07-22